# Patient Record
Sex: MALE | Race: WHITE
[De-identification: names, ages, dates, MRNs, and addresses within clinical notes are randomized per-mention and may not be internally consistent; named-entity substitution may affect disease eponyms.]

---

## 2020-12-29 ENCOUNTER — HOSPITAL ENCOUNTER (EMERGENCY)
Dept: HOSPITAL 75 - ER | Age: 45
Discharge: HOME | End: 2020-12-29
Payer: SELF-PAY

## 2020-12-29 VITALS — BODY MASS INDEX: 28.41 KG/M2 | WEIGHT: 191.8 LBS | HEIGHT: 68.98 IN

## 2020-12-29 VITALS — SYSTOLIC BLOOD PRESSURE: 128 MMHG | DIASTOLIC BLOOD PRESSURE: 83 MMHG

## 2020-12-29 DIAGNOSIS — E78.00: ICD-10-CM

## 2020-12-29 DIAGNOSIS — R53.1: Primary | ICD-10-CM

## 2020-12-29 LAB
ALBUMIN SERPL-MCNC: 4.1 GM/DL (ref 3.2–4.5)
ALP SERPL-CCNC: 78 U/L (ref 40–136)
ALT SERPL-CCNC: 38 U/L (ref 0–55)
APTT BLD: 28 SEC (ref 24–35)
APTT PPP: YELLOW S
BACTERIA #/AREA URNS HPF: (no result) /HPF
BASOPHILS # BLD AUTO: 0.1 10^3/UL (ref 0–0.1)
BASOPHILS NFR BLD AUTO: 1 % (ref 0–10)
BILIRUB SERPL-MCNC: 0.3 MG/DL (ref 0.1–1)
BILIRUB UR QL STRIP: NEGATIVE
BUN/CREAT SERPL: 11
CALCIUM SERPL-MCNC: 9.4 MG/DL (ref 8.5–10.1)
CHLORIDE SERPL-SCNC: 108 MMOL/L (ref 98–107)
CO2 SERPL-SCNC: 25 MMOL/L (ref 21–32)
CREAT SERPL-MCNC: 1.16 MG/DL (ref 0.6–1.3)
D DIMER PPP FEU-MCNC: <= 0.27 UG/ML (ref 0–0.49)
EOSINOPHIL # BLD AUTO: 0.1 10^3/UL (ref 0–0.3)
EOSINOPHIL NFR BLD AUTO: 1 % (ref 0–10)
FIBRINOGEN PPP-MCNC: CLEAR MG/DL
GFR SERPLBLD BASED ON 1.73 SQ M-ARVRAT: > 60 ML/MIN
GLUCOSE SERPL-MCNC: 183 MG/DL (ref 70–105)
GLUCOSE UR STRIP-MCNC: NEGATIVE MG/DL
HCT VFR BLD CALC: 43 % (ref 40–54)
HGB BLD-MCNC: 13.8 G/DL (ref 13.3–17.7)
INR PPP: 0.9 (ref 0.8–1.4)
KETONES UR QL STRIP: NEGATIVE
LEUKOCYTE ESTERASE UR QL STRIP: NEGATIVE
LYMPHOCYTES # BLD AUTO: 1.7 10^3/UL (ref 1–4)
LYMPHOCYTES NFR BLD AUTO: 21 % (ref 12–44)
MANUAL DIFFERENTIAL PERFORMED BLD QL: NO
MCH RBC QN AUTO: 30 PG (ref 25–34)
MCHC RBC AUTO-ENTMCNC: 32 G/DL (ref 32–36)
MCV RBC AUTO: 93 FL (ref 80–99)
MONOCYTES # BLD AUTO: 0.7 10^3/UL (ref 0–1)
MONOCYTES NFR BLD AUTO: 8 % (ref 0–12)
NEUTROPHILS # BLD AUTO: 5.5 10^3/UL (ref 1.8–7.8)
NEUTROPHILS NFR BLD AUTO: 69 % (ref 42–75)
NITRITE UR QL STRIP: NEGATIVE
PH UR STRIP: 6 [PH] (ref 5–9)
PLATELET # BLD: 306 10^3/UL (ref 130–400)
PMV BLD AUTO: 9.4 FL (ref 9–12.2)
POTASSIUM SERPL-SCNC: 3.8 MMOL/L (ref 3.6–5)
PROT SERPL-MCNC: 6.9 GM/DL (ref 6.4–8.2)
PROT UR QL STRIP: NEGATIVE
PROTHROMBIN TIME: 12.4 SEC (ref 12.2–14.7)
RBC #/AREA URNS HPF: (no result) /HPF
SODIUM SERPL-SCNC: 141 MMOL/L (ref 135–145)
SP GR UR STRIP: 1.01 (ref 1.02–1.02)
SQUAMOUS #/AREA URNS HPF: (no result) /HPF
WBC # BLD AUTO: 8 10^3/UL (ref 4.3–11)
WBC #/AREA URNS HPF: (no result) /HPF

## 2020-12-29 PROCEDURE — 85025 COMPLETE CBC W/AUTO DIFF WBC: CPT

## 2020-12-29 PROCEDURE — 80053 COMPREHEN METABOLIC PANEL: CPT

## 2020-12-29 PROCEDURE — 81000 URINALYSIS NONAUTO W/SCOPE: CPT

## 2020-12-29 PROCEDURE — 85730 THROMBOPLASTIN TIME PARTIAL: CPT

## 2020-12-29 PROCEDURE — 36415 COLL VENOUS BLD VENIPUNCTURE: CPT

## 2020-12-29 PROCEDURE — 93041 RHYTHM ECG TRACING: CPT

## 2020-12-29 PROCEDURE — 93005 ELECTROCARDIOGRAM TRACING: CPT

## 2020-12-29 PROCEDURE — 70498 CT ANGIOGRAPHY NECK: CPT

## 2020-12-29 PROCEDURE — 84484 ASSAY OF TROPONIN QUANT: CPT

## 2020-12-29 PROCEDURE — 71045 X-RAY EXAM CHEST 1 VIEW: CPT

## 2020-12-29 PROCEDURE — 70496 CT ANGIOGRAPHY HEAD: CPT

## 2020-12-29 PROCEDURE — 85610 PROTHROMBIN TIME: CPT

## 2020-12-29 PROCEDURE — 85379 FIBRIN DEGRADATION QUANT: CPT

## 2020-12-29 NOTE — DIAGNOSTIC IMAGING REPORT
INDICATION: 

Left-sided weakness.



TIME OF EXAM: 

2:46 PM.



COMPARISON: 

No prior studies are available for comparison.



FINDINGS:

The heart size is normal. The pulmonary vascularity is

unremarkable. The lungs are clear. No infiltrate, effusion, or

pneumothorax is detected.



IMPRESSION: 

No acute cardiopulmonary process is detected.



Dictated by: 



  Dictated on workstation # [id number]

## 2020-12-29 NOTE — DIAGNOSTIC IMAGING REPORT
PROCEDURE: CT angiography of the head and CT angiography of the

neck with and without contrast.



TECHNIQUE: Contiguous noncontrast images were obtained from the

skull base through the vertex. After intravenous contrast

administration, helical CT angiography of the neck was performed.

Source data was reformatted into 3D MIP projections. Delayed post

contrast acquisition was also obtained. Auto Exposure Controls

were utilized during the CT exam to meet ALARA standards for

radiation dose reduction. 



INDICATION: Left weakness, headache, and dizziness.



FINDINGS: There appears to be normal three-vessel branching

pattern arising from the aortic arch. Carotid and vertebral

arteries are patent through the neck without evidence of

stenosis, occlusion, or filling defect. There is no evidence of

abnormal contrast enhancement in the neck. Tiny subcentimeter

cyst is noted in the upper pole of the left lobe of the thyroid

gland. Very mild degenerative findings are seen in the cervical

spine.



Anterior, middle, and posterior cerebral arteries are patent

without evidence of stenosis, occlusion, or filling defect. There

is no evidence of aneurysm or vascular malformation. Left P1

segment appears to be small or absent. No abnormal contrast

enhancement is identified in the brain.



IMPRESSION: No CTA evidence of great vessel abnormality in the

head or neck. In particular, no stenosis, occlusion, or filling

defect is identified.



Dictated by: 



  Dictated on workstation # [id number]

## 2020-12-29 NOTE — ED NEUROLOGICAL PROBLEM
General


Chief Complaint:  Neuro-Stroke Like Symptoms


Stated Complaint:  SOB/LETHARGIC/WEAK


Nursing Triage Note:  


Pt ambulatory to rm 4.  Pt sent to ED from [facility].  Pt reports at approximately L 


hand and L foot felt cold and pt had difficulty breathing.  Pt reports weak 


.  Pt is concerned about high blood pressure and reports having personal 


problems.


Nursing Sepsis Screen:  No Definite Risk


Source:  patient, 


Exam Limitations:  language barrier





History of Present Illness


Date Seen by Provider:  Dec 29, 2020


Time Seen by Provider:  13:50


Initial Comments


This 45-year-old gentleman presents to the emergency room with left-sided 

weakness that started approximately 09:00 this morning.  He presented to the [facility]

clinic and was referred to the emergency room due to concern for stroke.  Verbal

report was received from clinic staff.  They reported measurable weakness in the

left hand at that time.  Patient states all symptoms have now resolved.  His 

symptoms started with a cold feeling of discomfort in the left hand and left 

foot.  He then developed the weakness that persisted until he was seen at the 

clinic.  He has risk factors for stroke which include diabetes and hypertension.

 He reports a similar episode happened on  that resolved without 

residual.  He reports blood sugar during the first episode was 70 and 86 after 

the second episode.  Patient had Covid in July from which he has recovered.  He 

reports recent "personal problems" that have worsened his blood pressure.  He is

concerned about blood pressure elevation.





Allergies and Home Medications


Allergies


Coded Allergies:  


     No Known Drug Allergies (Unverified , 20)





Home Medications


Atorvastatin Calcium 40 Mg Tablet, 40 MG PO DAILY


   Prescribed by: JILLIAN DIALLO on 20 1614


Clopidogrel Bisulfate 75 Mg Tablet, 75 MG PO DAILY


   Prescribed by: JILLIAN DIALLO on 20 1614





Patient Home Medication List


Home Medication List Reviewed:  Yes





Review of Systems


Review of Systems


Constitutional:  no symptoms reported


Eyes:  No Symptoms Reported


Ears, Nose, Mouth, Throat:  no symptoms reported


Respiratory:  no symptoms reported


Cardiovascular:  no symptoms reported


Gastrointestinal:  no symptoms reported


Genitourinary:  no symptoms reported


Musculoskeletal:  no symptoms reported


Skin:  no symptoms reported


Psychiatric/Neurological:  See HPI


Endocrine:  No Symptoms Reported


Hematologic/Lymphatic:  No Symptoms Reported





Past Medical-Social-Family Hx


Patient Social History


Alcohol Use:  Denies Use


Recreational Drug Use:  No


2nd Hand Smoke Exposure:  No


Recent Foreign Travel:  No


Contact w/Someone Who Travel:  No


Recent Infectious Disease Expo:  No


Recent Hopitalizations:  No





Past Medical History


Surgeries:  Yes


Abdominal (Hernia)


Cardiac:  Yes


High Cholesterol, Hypertension


Reproductive Disorders:  No


Gastrointestinal:  No


Musculoskeletal:  Yes


Rheumatoid Arthritis


Endocrine:  Yes


Diabetes, Non-Insulin dep


HEENT:  No


Cancer:  No


Psychosocial:  No


Integumentary:  No





Physical Exam


Vital Signs





Vital Signs - First Documented








 20





 13:47


 


Temp 36.4


 


Pulse 82


 


Resp 18


 


B/P (MAP) 145/101 (116)


 


Pulse Ox 97


 


O2 Delivery Room Air





Capillary Refill : Less Than 3 Seconds


Height, Weight, BMI


Height: '"


Weight: lbs. oz. kg; 28.00 BMI


Method:


General Appearance:  WD/WN, no apparent distress


HEENT:  PERRL/EOMI, normal ENT inspection, pharynx normal


Neck:  normal inspection


Respiratory:  lungs clear, normal breath sounds, no respiratory distress


Cardiovascular:  regular rate, rhythm, no edema, no murmur


Gastrointestinal:  normal bowel sounds, non tender, soft


Extremities:  normal inspection, no pedal edema


Neurologic/Psychiatric:  CNs II-XII nml as tested, no motor/sensory deficits, 

alert, normal mood/affect, oriented x 3


Crainal Nerves:  normal hearing, normal speech, PERRL


Coordination/Gait:  normal finger to nose (Normal heel-to-shin), normal gait


Motor/Sensory:  no motor deficit, no sensory deficit


Skin:  normal color, warm/dry





Stroke


NIH Stroke Scale Assessment





   Level of Consciousness: 0=Alert (0), Level of Consciousness-Questions: 

   0=Answers both month/age (0), LOC Commands: 0=Performs both tasks (0), Visual

   Fields: 0=No visual loss (0), Facial Movement (Facial Paresis): 0=Normal 

   symmetrical mnt (0), Motor Function-Arms Right: 0=No drift (0), Motor 

   Function-Arms Left: 0=No drift (0), Motor Function-Legs Right: 0=No drift 

   (0), Motor Function-Legs Left: 0=No drift (0), Limb Ataxia: 0=Absent (0), 

   Sensory: 0=Normal:no loss (0), Best Language: 0=No aphasia (0), Dysarthria: 

   0=Normal (0), Extinction & Inattention: 0=No abnormality (0), Total: 0





Progress/Results/Core Measures


Results/Orders


Lab Results





Laboratory Tests








Test


 20


13:54 20


14:25 Range/Units


 


 


White Blood Count


 8.0 


 


 4.3-11.0


10^3/uL


 


Red Blood Count


 4.58 


 


 4.30-5.52


10^6/uL


 


Hemoglobin 13.8   13.3-17.7  g/dL


 


Hematocrit 43   40-54  %


 


Mean Corpuscular Volume 93   80-99  fL


 


Mean Corpuscular Hemoglobin 30   25-34  pg


 


Mean Corpuscular Hemoglobin


Concent 32 


 


 32-36  g/dL





 


Red Cell Distribution Width 14.0   10.0-14.5  %


 


Platelet Count


 306 


 


 130-400


10^3/uL


 


Mean Platelet Volume 9.4   9.0-12.2  fL


 


Immature Granulocyte % (Auto) 0    %


 


Neutrophils (%) (Auto) 69   42-75  %


 


Lymphocytes (%) (Auto) 21   12-44  %


 


Monocytes (%) (Auto) 8   0-12  %


 


Eosinophils (%) (Auto) 1   0-10  %


 


Basophils (%) (Auto) 1   0-10  %


 


Neutrophils # (Auto)


 5.5 


 


 1.8-7.8


10^3/uL


 


Lymphocytes # (Auto)


 1.7 


 


 1.0-4.0


10^3/uL


 


Monocytes # (Auto)


 0.7 


 


 0.0-1.0


10^3/uL


 


Eosinophils # (Auto)


 0.1 


 


 0.0-0.3


10^3/uL


 


Basophils # (Auto)


 0.1 


 


 0.0-0.1


10^3/uL


 


Immature Granulocyte # (Auto)


 0.0 


 


 0.0-0.1


10^3/uL


 


Prothrombin Time 12.4   12.2-14.7  SEC


 


INR Comment 0.9   0.8-1.4  


 


Activated Partial


Thromboplast Time 28 


 


 24-35  SEC





 


D-Dimer


 <= 0.27 


 


 0.00-0.49


UG/ML


 


Sodium Level 141   135-145  MMOL/L


 


Potassium Level 3.8   3.6-5.0  MMOL/L


 


Chloride Level 108 H    MMOL/L


 


Carbon Dioxide Level 25   21-32  MMOL/L


 


Anion Gap 8   5-14  MMOL/L


 


Blood Urea Nitrogen 13   7-18  MG/DL


 


Creatinine


 1.16 


 


 0.60-1.30


MG/DL


 


Estimat Glomerular Filtration


Rate > 60 


 


  





 


BUN/Creatinine Ratio 11    


 


Glucose Level 183 H    MG/DL


 


Calcium Level 9.4   8.5-10.1  MG/DL


 


Corrected Calcium 9.3   8.5-10.1  MG/DL


 


Total Bilirubin 0.3   0.1-1.0  MG/DL


 


Aspartate Amino Transf


(AST/SGOT) 21 


 


 5-34  U/L





 


Alanine Aminotransferase


(ALT/SGPT) 38 


 


 0-55  U/L





 


Alkaline Phosphatase 78     U/L


 


Troponin I < 0.028   <0.028  NG/ML


 


Total Protein 6.9   6.4-8.2  GM/DL


 


Albumin 4.1   3.2-4.5  GM/DL


 


Urine Color  YELLOW   


 


Urine Clarity  CLEAR   


 


Urine pH  6.0  5-9  


 


Urine Specific Gravity  1.015 L 1.016-1.022  


 


Urine Protein  NEGATIVE  NEGATIVE  


 


Urine Glucose (UA)  NEGATIVE  NEGATIVE  


 


Urine Ketones  NEGATIVE  NEGATIVE  


 


Urine Nitrite  NEGATIVE  NEGATIVE  


 


Urine Bilirubin  NEGATIVE  NEGATIVE  


 


Urine Urobilinogen  0.2  < = 1.0  MG/DL


 


Urine Leukocyte Esterase  NEGATIVE  NEGATIVE  


 


Urine RBC (Auto)  NEGATIVE  NEGATIVE  


 


Urine RBC  NONE   /HPF


 


Urine WBC  RARE   /HPF


 


Urine Squamous Epithelial


Cells 


 RARE 


  /HPF





 


Urine Crystals  NONE   /LPF


 


Urine Bacteria  TRACE   /HPF


 


Urine Casts  NONE   /LPF


 


Urine Mucus  NEGATIVE   /LPF


 


Urine Culture Indicated  NO   








My Orders





Orders - JILLIAN MCCARTNEY MD


Cbc With Automated Diff (20 14:17)


Protime With Inr (20 14:17)


Partial Thromboplastin Time (20 14:17)


Comprehensive Metabolic Panel (20 14:17)


Fibrin Degradation Products (20 14:17)


Troponin I (20 14:17)


Ua Culture If Indicated (20 14:17)


Chest 1 View, Ap/Pa Only (20 14:17)


Ekg Tracing (20 14:17)


Nothing By Mouth (20 Lunch)


Accucheck Stat ONCE (20 14:17)


Ed Iv/Invasive Line Start (20 14:17)


Ed Iv/Invasive Line Start (20 14:17)


Vital Signs Stroke Patient Q15M (20 14:17)


O2 (20 14:17)


Intake & Output 06,14,22 (20 14:17)


Monitor-Rhythm Ecg Trace Only (20 14:17)


Dysphagia Screening Tool (20 14:17)


Ct Angio Head/Neck (20 14:17)


Aspirin Tablet (Aspirin Tablet) (20 16:15)


Clopidogrel Tablet (Plavix Tablet) (20 16:15)





Medications Given in ED





Current Medications








 Medications  Dose


 Ordered  Sig/Stew


 Route  Start Time


 Stop Time Status Last Admin


Dose Admin


 


 Aspirin  325 mg  ONCE  ONCE


 PO  20 16:15


 20 16:19 DC 20 16:24


325 MG


 


 Clopidogrel


 Bisulfate  75 mg  ONCE  ONCE


 PO  20 16:15


 20 16:19 DC 20 16:27


75 MG


 


 Iohexol  75 ml  ONCE  ONCE


 IV  20 14:45


 20 15:00 DC 20 15:06


75 ML


 


 Sodium Chloride  100 ml  ONCE  ONCE


 IV  20 14:45


 20 15:00 DC 20 15:06


100 ML








Vital Signs/I&O











 20





 13:47 16:33


 


Temp 36.4 36.4


 


Pulse 82 82


 


Resp 18 18


 


B/P (MAP) 145/101 (116) 128/83 (116)


 


Pulse Ox 97 97


 


O2 Delivery Room Air Room Air














Blood Pressure Mean:                    116











Progress


Progress Note :  


Progress Note


Stroke activation was paged upon patient arrival.  He was not considered for 

thrombolytic therapy as he was past the 4.5-hour window.  NIH stroke score was 0

which also is a contraindication for thrombolytic therapy.  He remained free of 

neurologic deficits throughout his ER stay.  Work-up was unremarkable.  His ABCD

2 score was 5.  For this reason neurology was consulted.  I discussed the case 

with Dr. Butler, stroke neurologist at [facility].  He recommended Plavix therapy x3 

weeks and continuous aspirin therapy as well as high-dose statin therapy.  He 

recommended completing the stroke work-up with MRI and echocardiogram within 1 

week.  I discussed this with the patient and suggested inpatient observation 

overnight so that MRI and echo could be obtained promptly.  Patient will need 

financial assistance to obtain these as an outpatient.  After discussing the 

situation, patient elects to be discharged from the ER at work on obtained a 

echo and MRI on an outpatient basis.  This was discussed with Dr. Andino with the

[facility] clinic.  Patient received aspirin and Plavix prior to discharge.


Initial ECG Impression Date:  Dec 29, 2020


Initial ECG Impression Time:  13:48


Initial ECG Rate:  84


Initial ECG Rhythm:  Normal Sinus


Comment


Normal sinus rhythm with no ischemic ST elevation or depression.  Early 

repolarization pattern noted.  No abnormal intervals or axis deviation.





Diagnostic Imaging





   Diagonstic Imaging:  Xray


   Plain Films/CT/US/NM/MRI:  chest


Comments


NAME:   VANNESA CANCHOLA


MED REC#:   [id number]


ACCOUNT#:   [id number]


PT STATUS:   REG ER


:   1975


PHYSICIAN:   JILLIAN MCCARTNEY MD


ADMIT DATE:   20/ER


***Signed***


Date of Exam:20





CHEST 1 VIEW, AP/PA ONLY








INDICATION: 


Left-sided weakness.





TIME OF EXAM: 


2:46 PM.





COMPARISON: 


No prior studies are available for comparison.





FINDINGS:


The heart size is normal. The pulmonary vascularity is


unremarkable. The lungs are clear. No infiltrate, effusion, or


pneumothorax is detected.





IMPRESSION: 


No acute cardiopulmonary process is detected.





Dictated by: 





  Dictated on workstation # [id number]








Dict:   20 1503


Trans:   20 1557


 [id number]





Interpreted by:     EDINSON GARCIA MD


Electronically signed by: EDINSON GARCIA MD 20 1557








   Diagonstic Imaging:  CT


Comments


NAME:   RICARDO CANCHOLAO


MED REC#:   [id number]


ACCOUNT#:   [id number]


PT STATUS:   REG ER


:   1975


PHYSICIAN:   JILLIAN MCCARTNEY MD


ADMIT DATE:   20/ER


***Draft***


Date of Exam:20





CT ANGIO HEAD/NECK








PROCEDURE: CT angiography of the head and CT angiography of the


neck with and without contrast.





TECHNIQUE: Contiguous noncontrast images were obtained from the


skull base through the vertex. After intravenous contrast


administration, helical CT angiography of the neck was performed.


Source data was reformatted into 3D MIP projections. Delayed post


contrast acquisition was also obtained. Auto Exposure Controls


were utilized during the CT exam to meet ALARA standards for


radiation dose reduction. 





INDICATION: Left weakness, headache, and dizziness.





FINDINGS: There appears to be normal three-vessel branching


pattern arising from the aortic arch. Carotid and vertebral


arteries are patent through the neck without evidence of


stenosis, occlusion, or filling defect. There is no evidence of


abnormal contrast enhancement in the neck. Tiny subcentimeter


cyst is noted in the upper pole of the left lobe of the thyroid


gland. Very mild degenerative findings are seen in the cervical


spine.





Anterior, middle, and posterior cerebral arteries are patent


without evidence of stenosis, occlusion, or filling defect. There


is no evidence of aneurysm or vascular malformation. Left P1


segment appears to be small or absent. No abnormal contrast


enhancement is identified in the brain.





IMPRESSION: No CTA evidence of great vessel abnormality in the


head or neck. In particular, no stenosis, occlusion, or filling


defect is identified.





  Dictated on workstation # [id number]








Dict:   20 1507


Trans:   20 1514


[id number]





Interpreted by:     EDWAR SHETH MD





Departure


Impression





   Primary Impression:  


   Left-sided weakness


Disposition:  01 HOME, SELF-CARE


Condition:  Improved





Departure-Patient Inst.


Decision time for Depature:  16:08


Patient Instructions:  Transient Ischemic Attack (DC)





Add. Discharge Instructions:  


Your symptoms today and on  may have been related to TIA (transient 

ischemic attack) the symptoms may be a precursor to a larger, more permanent 

stroke.  It is important that you follow-up with your primary care provider as 

soon as possible.  There are additional studies your primary care provider needs

to arrange to complete the stroke work-up.  Those studies include MRI of the 

brain and echocardiogram of the heart.


Please return to the emergency room immediately if you develop any further 

symptoms of stroke which would include numbness or weakness of a body part, 

difficulty speaking, confusion, drooping of your face, sudden change in 

eyesight, dizziness or loss of balance, or any other sudden neurologic changes.


To help prevent stroke, you should take the following medications:


1.  Aspirin 81 mg daily continuously (purchase over-the-counter)


2.  Plavix 75 mg for 3 weeks only


3.  Atorvastatin 40 mg daily continuously


Continue these medications in addition to any other medications your primary 

care provider has prescribed.











All discharge instructions reviewed with patient and/or family. Voiced 

understanding.


Scripts


Atorvastatin Calcium (Atorvastatin Calcium) 40 Mg Tablet


40 MG PO DAILY, #30 TAB


   Prov: JILLIAN MCCARTNEY MD         20 


Clopidogrel Bisulfate (Plavix) 75 Mg Tablet


75 MG PO DAILY, #20 TAB


   Prov: JILLIAN MCCARTNEY MD         20





Copy


Copies To 1:   BHARGAVI ANDINO MD, JOSHUA T MD        Dec 29, 2020 16:12

## 2021-01-05 ENCOUNTER — HOSPITAL ENCOUNTER (OUTPATIENT)
Dept: HOSPITAL 75 - CARD | Age: 46
End: 2021-01-05
Attending: FAMILY MEDICINE
Payer: SELF-PAY

## 2021-01-05 DIAGNOSIS — G45.9: Primary | ICD-10-CM

## 2021-01-05 PROCEDURE — 70551 MRI BRAIN STEM W/O DYE: CPT

## 2021-01-05 PROCEDURE — 93306 TTE W/DOPPLER COMPLETE: CPT

## 2021-01-05 NOTE — DIAGNOSTIC IMAGING REPORT
PROCEDURE: MR imaging of the brain without contrast.



TECHNIQUE: Multiplanar, multisequence MR imaging of the brain was

performed without contrast.



INDICATION:  History of left-sided weakness. Concern for TIA or

stroke.



COMPARISON: CTA head and neck on 12/29/2020.



Findings: No acute ischemia, mass, or hemorrhage. The ventricles,

cortical sulci, and basilar cisterns are symmetric and

unremarkable. The sellar and suprasellar regions have a normal

appearance. 



The brainstem and posterior fossa are unremarkable.



The paranasal sinuses and mastoid air cells demonstrate normal

signal characteristics. The globes and orbits are symmetric and

unremarkable. The scalp and calvarium have a normal appearance.



Impression:

1. No acute ischemia, mass, or hemorrhage.







Dictated by: 



  Dictated on workstation # [id number]